# Patient Record
Sex: MALE | Race: WHITE | Employment: UNEMPLOYED | URBAN - METROPOLITAN AREA
[De-identification: names, ages, dates, MRNs, and addresses within clinical notes are randomized per-mention and may not be internally consistent; named-entity substitution may affect disease eponyms.]

---

## 2020-11-19 ENCOUNTER — INITIAL CONSULT (OUTPATIENT)
Dept: BARIATRICS/WEIGHT MGMT | Age: 40
End: 2020-11-19
Payer: MEDICAID

## 2020-11-19 VITALS
BODY MASS INDEX: 44.1 KG/M2 | HEART RATE: 83 BPM | SYSTOLIC BLOOD PRESSURE: 130 MMHG | HEIGHT: 71 IN | DIASTOLIC BLOOD PRESSURE: 85 MMHG | TEMPERATURE: 97.7 F | RESPIRATION RATE: 20 BRPM | WEIGHT: 315 LBS

## 2020-11-19 PROCEDURE — G8417 CALC BMI ABV UP PARAM F/U: HCPCS | Performed by: SURGERY

## 2020-11-19 PROCEDURE — G8484 FLU IMMUNIZE NO ADMIN: HCPCS | Performed by: SURGERY

## 2020-11-19 PROCEDURE — 99202 OFFICE O/P NEW SF 15 MIN: CPT

## 2020-11-19 PROCEDURE — G8427 DOCREV CUR MEDS BY ELIG CLIN: HCPCS | Performed by: SURGERY

## 2020-11-19 PROCEDURE — 2022F DILAT RTA XM EVC RTNOPTHY: CPT | Performed by: SURGERY

## 2020-11-19 PROCEDURE — 99204 OFFICE O/P NEW MOD 45 MIN: CPT | Performed by: SURGERY

## 2020-11-19 RX ORDER — AMLODIPINE BESYLATE 5 MG/1
5 TABLET ORAL DAILY
COMMUNITY
Start: 2020-10-29

## 2020-11-19 RX ORDER — LISINOPRIL 40 MG/1
40 TABLET ORAL DAILY
COMMUNITY
Start: 2020-10-29

## 2020-11-19 RX ORDER — ESCITALOPRAM OXALATE 20 MG/1
20 TABLET ORAL DAILY
COMMUNITY
Start: 2020-08-19

## 2020-11-19 RX ORDER — DM/PE/ACETAMINOPHEN/CHLORPHENR 10-5-325-2
5000 TABLET, SEQUENTIAL ORAL DAILY
COMMUNITY
Start: 2020-10-29

## 2020-11-19 RX ORDER — ROSUVASTATIN CALCIUM 10 MG/1
10 TABLET, COATED ORAL DAILY
COMMUNITY
Start: 2020-09-30

## 2020-11-19 RX ORDER — EMPAGLIFLOZIN 10 MG/1
10 TABLET, FILM COATED ORAL DAILY
COMMUNITY
Start: 2020-09-30

## 2020-11-19 RX ORDER — SODIUM CHLORIDE, SODIUM LACTATE, POTASSIUM CHLORIDE, CALCIUM CHLORIDE 600; 310; 30; 20 MG/100ML; MG/100ML; MG/100ML; MG/100ML
INJECTION, SOLUTION INTRAVENOUS CONTINUOUS
Status: CANCELLED | OUTPATIENT
Start: 2020-11-19

## 2020-11-19 RX ORDER — LIRAGLUTIDE 6 MG/ML
1.8 INJECTION SUBCUTANEOUS DAILY
COMMUNITY
Start: 2020-09-30

## 2020-11-19 NOTE — PATIENT INSTRUCTIONS
What is the next step to proceed with weight loss surgery? Please be aware that any co-pays or deductibles may be requested prior to testing and / or procedures. You will need to schedule a psychological evaluation for weight loss surgery. Patients will be required to complete all psychological testing as required by the mental health provider. Patients must also follow all of the provider's recommendations before weight loss surgery can be scheduled. The evaluation must be done a standard way for weight loss surgery. We strongly recommend that you contact one of our preferred providers listed below to arrange this:      Buck Amezcua, [facility]  [address] [facility], [address] [place] [address]    Dr. Maurice Mccollum, PhD  [address]   [phone]    Dr. Melinda Molina, PhD    [facility]. [place], [place]    [phone]      You will also need to plan on attending a 2 hour nutrition class at [facility] prior to your surgery. We will schedule this for you when we schedule your surgery. Please remember to have your labs drawn 10 days prior to your first scheduled dietary appointment. Please remember, that while we will submit your case to insurance for surgery authorization, it is your responsibility to know if your plan covers weight loss surgery and keep up-to-date with changes to your insurance coverage. We will do everything possible to help you get approved for weight loss surgery, but cannot guarantee an approval.     Please note that you will not be submitted to your insurance company until all pre-operative testing requirements are met.

## 2020-11-19 NOTE — PROGRESS NOTES
tablet Take 10 mg by mouth daily Yes Historical Provider, MD   VICTOZA 18 MG/3ML SOPN SC injection Inject 1.8 mg into the skin daily Yes Historical Provider, MD     Social History:   TOBACCO:   reports that he has never smoked. He has quit using smokeless tobacco.  His smokeless tobacco use included chew. All smokers must join the free smoking cessation program and stop smoking for 3 months before having any Bariatric surgery. ETOH:    has no history on file for alcohol. No family history on file. Review of Systems:  Psychiatric:  depression and anxiety  Respiratory: negative  Cardiovascular: negative  Gastrointestinal: negative  Musculoskeletal:negative  All others reviewed, negative    Physical Exam:   VITALS: Blood pressure 130/85, pulse 83, temperature 97.7 °F (36.5 °C), temperature source Temporal, resp. rate 20, height 5' 10.5\" (1.791 m), weight (!) 334 lb (151.5 kg). General appearance: alert, appears stated age and cooperative, does ambulate easily  Head: Normocephalic, without obvious abnormality, atraumatic  Eyes: PERRL  Ears/mouth/throat:  Ears clear, mouth normal, throat no redness  Neck: no adenopathy, no JVD, supple, symmetrical, trachea midline and thyroid not enlarged  Lungs: clear to auscultation bilaterally  Heart: regular rate and rhythm  Abdomen: soft, non-tender; bowel sounds normal; no masses,  no organomegaly  Extremities: extremities normal, atraumatic, no cyanosis or edema  Skin: no open wounds    Assessment:  Morbid obesity with inability to keep the weight off with diet and exercise. He is interested in Laparoscopic Carlota-en- Y Gastric Bypass or Sleeve Gastrectomy. We discussed that our goal is to ameliorate the medical problems and not to obtain a specific body mass index. He understands the risks and benefits, and wishes to proceed with the evaluation. Plan:  Psych evaluation, GB US, medical and cardiac clearance.  These patients often have vitamin deficiencies so I will do screening labs for malnutrition. I will do an upper endoscopy to check for hiatal hernias, ulcers and H. Pylori while we wait for insurance approval for the surgery.     Physician Signature: Electronically signed by Dr. Tony Badillo MD    Send copy of H&P to PCP, JOSE Nieves - CNP

## 2020-12-11 ENCOUNTER — TELEPHONE (OUTPATIENT)
Dept: BARIATRICS/WEIGHT MGMT | Age: 40
End: 2020-12-11

## 2020-12-11 NOTE — TELEPHONE ENCOUNTER
Tried to call and cancel egd due to no covid testing done. Phone was not accepting phone calls at this time.

## 2021-01-05 ENCOUNTER — TELEPHONE (OUTPATIENT)
Dept: BARIATRICS/WEIGHT MGMT | Age: 41
End: 2021-01-05

## 2021-01-05 DIAGNOSIS — Z00.8 NUTRITIONAL ASSESSMENT: ICD-10-CM

## 2021-01-05 DIAGNOSIS — Z71.3 NUTRITIONAL COUNSELING: Primary | ICD-10-CM

## 2021-01-05 NOTE — TELEPHONE ENCOUNTER
Shahzad White called pt to complete initial nutrition consult. Phone answers the subscriber you dialed is no longer in-service. Shahzad White tried calling it with regular area code and long distance area code and received the same message. Shahzad White mailed pt a letter to call the [facility] to reschedule. See attached letter.

## 2021-01-05 NOTE — LETTER
[facility]   [address]  Phone: [phone]  Fax: [phone]    Tamara Beverly RD, MONTSE        January 5, 2021     [place] Hernandez[address]      Dear Tobias Khalil:    We are sorry that you missed your appointment with Tamara Beverly RDN / M[facility] on 1/5/2021. We tried calling your phone number listed several times but your phone is no longer in-service. Your health and follow-up medical care are important to us. Please call our office as soon as possible so that we may reschedule your appointment. If you have already rescheduled your appointment, please disregard this letter.     Sincerely,        Tamara Beverly RD, LD

## 2021-02-09 ENCOUNTER — TELEPHONE (OUTPATIENT)
Dept: BARIATRICS/WEIGHT MGMT | Age: 41
End: 2021-02-09

## 2021-02-09 NOTE — LETTER
[facility]   [address]  Phone: [phone]  Fax: [phone]    Cliff Levi RD, MONTSE        February 9, 2021    Samantha Hernandez[address]            Dear Chet Mujica:    We are sorry that you missed your appointment with Cliff Levi RDN / MONT[facility] on 1/5/2021. We tried calling your phone number listed several times but your phone is no longer in-service. Your health and follow-up medical care are important to us. Please call our office as soon as possible so that we may reschedule your appointment. If you have already rescheduled your appointment, please disregard this letter.     Sincerely,        Cliff Levi RD, MONTSE

## 2021-02-09 NOTE — TELEPHONE ENCOUNTER
2/9/21 - 2nd Attempt  Shahzad White called pt to complete initial nutrition consult. Phone answers the subscriber you dialed is no longer in-service. Shahzad White tried calling it with regular area code and long distance area code and received the same message. Shahzad White mailed pt a letter to call the [facility] to reschedule.     See attached letter. 1/5/21 - 1st Attempt  Shahzad White called pt to complete initial nutrition consult. Phone answers the subscriber you dialed is no longer in-service. Shahzad White tried calling it with regular area code and long distance area code and received the same message. Shahzad White mailed pt a letter to call the [facility] to reschedule.     See attached letter.

## 2021-03-11 ENCOUNTER — TELEPHONE (OUTPATIENT)
Dept: BARIATRICS/WEIGHT MGMT | Age: 41
End: 2021-03-11

## 2021-03-11 DIAGNOSIS — Z00.8 NUTRITIONAL ASSESSMENT: ICD-10-CM

## 2021-03-11 DIAGNOSIS — Z71.3 NUTRITIONAL COUNSELING: Primary | ICD-10-CM

## 2021-03-11 NOTE — LETTER
[facility]   [address]  Phone: [phone]  Fax: [phone]    Colt Aguila RD, LD        March 11, 2021    [place]lem[place] Hernandez[address]      Dear Michelle Mora:        We are sorry that you missed your appointment with Colt Aguila RDN / [facility] on 1/5/2021. We tried calling your phone number listed several times but your phone is no longer in-service. We have alos mailed several letters on 1/5/21,  2/9/21 and 3/11/21 for correspondence, Your health and follow-up medical care are important to us. Please call our office as soon as possible so that we may reschedule your appointment. If you have already rescheduled your appointment, please disregard this letter.     Sincerely,        Colt Aguila RD, LD

## 2021-03-11 NOTE — TELEPHONE ENCOUNTER
3/11/21 - 3rd and Final Attempt - 3rd Letter mailed  Shahzad White called pt to complete initial nutrition consult.  Phone answers the subscriber you dialed is no longer in-service.  Shahzad White tried calling it with regular area code and long distance area code and received the same message.  Shahzad White mailed pt a letter to call the [facility] to reschedule.     See attached letter.       2/9/21 - 2nd Attempt  Shahzad White called pt to complete initial nutrition consult.  Phone answers the subscriber you dialed is no longer in-service.  Shahzad White tried calling it with regular area code and long distance area code and received the same message.  Shahzad White mailed pt a letter to call the [facility] to reschedule.     See attached letter.      1/5/21 - 1st Attempt  Shahzad White called pt to complete initial nutrition consult.  Phone answers the subscriber you dialed is no longer in-service.  Shahzad White tried calling it with regular area code and long distance area code and received the same message.  Shahzad White mailed pt a letter to call the [facility] to reschedule.     See attached letter.